# Patient Record
Sex: FEMALE | Race: WHITE
[De-identification: names, ages, dates, MRNs, and addresses within clinical notes are randomized per-mention and may not be internally consistent; named-entity substitution may affect disease eponyms.]

---

## 2020-01-14 ENCOUNTER — HOSPITAL ENCOUNTER (OUTPATIENT)
Dept: HOSPITAL 5 - OR | Age: 45
Discharge: HOME | End: 2020-01-14
Attending: OBSTETRICS & GYNECOLOGY
Payer: MEDICAID

## 2020-01-14 VITALS — DIASTOLIC BLOOD PRESSURE: 56 MMHG | SYSTOLIC BLOOD PRESSURE: 138 MMHG

## 2020-01-14 DIAGNOSIS — Z98.890: ICD-10-CM

## 2020-01-14 DIAGNOSIS — Z98.891: ICD-10-CM

## 2020-01-14 DIAGNOSIS — Z30.2: Primary | ICD-10-CM

## 2020-01-14 DIAGNOSIS — Z87.891: ICD-10-CM

## 2020-01-14 DIAGNOSIS — Z79.899: ICD-10-CM

## 2020-01-14 DIAGNOSIS — K21.9: ICD-10-CM

## 2020-01-14 DIAGNOSIS — Z90.49: ICD-10-CM

## 2020-01-14 DIAGNOSIS — Z72.89: ICD-10-CM

## 2020-01-14 DIAGNOSIS — N76.0: ICD-10-CM

## 2020-01-14 PROCEDURE — 58671 LAPAROSCOPY TUBAL BLOCK: CPT

## 2020-01-14 PROCEDURE — 81025 URINE PREGNANCY TEST: CPT

## 2020-01-14 NOTE — ANESTHESIA CONSULTATION
Anesthesia Consult and Med Hx


Date of service: 01/14/20





- Airway


Anesthetic Teeth Evaluation: Good


ROM Head & Neck: Adequate


Mental/Hyoid Distance: Adequate


Mallampati Class: Class II


Intubation Access Assessment: Probably Good





- Pre-Operative Health Status


ASA Pre-Surgery Classification: ASA2


Proposed Anesthetic Plan: General





- Pulmonary


Hx Smoking: Yes (Former)





- Central Nervous System


Hx Psychiatric Problems: No





- Gastrointestinal


Hx Gastroesophageal Reflux Disease: Yes





- Other Systems


Hx Alcohol Use: Yes (Occas)


Hx Cancer: No

## 2020-01-14 NOTE — POST OPERATIVE NOTE
Date of procedure: 01/14/20


Pre-op diagnosis: sterilization


Post-op diagnosis: same


Findings: 





Pt with a known 3+ cm pedunculated myoma noted on the right side of the fundus. 

Tubes and ovaries WNL.  Pt also had some LUQ omental scarring to the ant 

peritoneal wall.  No other anomalies noted in general abd/pelvic survey.


Procedure: 





LAP BTL with Filschie clips.





Procedure: Patient taken to the operating room and prepped and draped in usual 

fashion.  An acorn uterine manipulator was placed along with the CO2 tenaculum. 

Simeon also placed.  Attention was then turned to the abdomen.  During her pelvic

exam in the OR uterus was palpable to almost the level of the umbilicus.  It was

maybe 2 cm below the level of the umbilicus.  As result the first trocar site 

was made in the left upper quadrant.  This was a 5 mm incision that was made 

about 2 finger breaths under the costal margin in the midclavicular line.  

Abdomen tented up and the Veress needle was placed in the abdominal cavity.  

Abdomen appropriately insufflated with CO2 gas.  Veress needle removed and the 5

mm trocar was placed.  The camera had to maneuver around some left upper 

quadrant omental adhesions to visualize the rest of the abdomen and pelvis.  

Patient placed in Trendelenburg at this point.  Attention was turned to the 

umbilicus where an 8 mm incision was made and the 8 mm trocar was placed under 

direct visualization without difficulty.  Attention was first turned to the 

right fallopian tube where the Filshie clip was applied.  The full width of the 

tube was encompassed.  Good hemostasis noted.  This was then done in the exact 

same fashion on the left side with equal success.  Full width of the tube was 

encompassed and good hemostasis noted.  The abdomen was fully desufflated at 

this time and the trochars were removed.  Trocar sites closed with 4-0 Monocryl 

in a subcuticular fashion followed by Marcaine.  [place] uterine manipulator was 

removed and the procedure was concluded at this point.  Patient tolerated the 

procedure well.  All instrument and lap counts were correct.  Patient taken to 

the recovery room in stable condition.


Anesthesia: STAN


Surgeon: TODD SAVAGE


Estimated blood loss: minimal


Pathology: none


Condition: stable


Disposition: PACU